# Patient Record
Sex: FEMALE | Race: BLACK OR AFRICAN AMERICAN | URBAN - METROPOLITAN AREA
[De-identification: names, ages, dates, MRNs, and addresses within clinical notes are randomized per-mention and may not be internally consistent; named-entity substitution may affect disease eponyms.]

---

## 2020-10-22 ENCOUNTER — ERX REFILL RESPONSE (OUTPATIENT)
Dept: URBAN - METROPOLITAN AREA CLINIC 13 | Facility: CLINIC | Age: 60
End: 2020-10-22

## 2020-10-22 RX ORDER — LANSOPRAZOLE 30 MG/1
TAKE 1 CAPSULE BY MOUTH EVERY DAY CAPSULE, DELAYED RELEASE ORAL
Qty: 90 | Refills: 3

## 2023-02-24 ENCOUNTER — OFFICE VISIT (OUTPATIENT)
Dept: URBAN - METROPOLITAN AREA CLINIC 92 | Facility: CLINIC | Age: 63
End: 2023-02-24

## 2023-02-24 NOTE — HPI-TODAY'S VISIT:
63-year-old female patient presents today with complaints of blood in the stool.  Her last colonoscopy was 9-2019 that demonstrated diverticula in sigmoid, otherwise normal repeat in 5 to 10 years Patient also had a upper endoscopy due to dysphagia, heartburn this demonstrated LA grade C reflux esophagitis biopsy showed reflux esophagitis no IN medium hiatal hernia, chronic gastritis no HP

## 2023-03-01 ENCOUNTER — OFFICE VISIT (OUTPATIENT)
Dept: URBAN - METROPOLITAN AREA CLINIC 92 | Facility: CLINIC | Age: 63
End: 2023-03-01
Payer: COMMERCIAL

## 2023-03-01 ENCOUNTER — DASHBOARD ENCOUNTERS (OUTPATIENT)
Age: 63
End: 2023-03-01

## 2023-03-01 ENCOUNTER — WEB ENCOUNTER (OUTPATIENT)
Dept: URBAN - METROPOLITAN AREA CLINIC 92 | Facility: CLINIC | Age: 63
End: 2023-03-01

## 2023-03-01 VITALS
HEART RATE: 97 BPM | WEIGHT: 235 LBS | DIASTOLIC BLOOD PRESSURE: 81 MMHG | SYSTOLIC BLOOD PRESSURE: 143 MMHG | BODY MASS INDEX: 44.37 KG/M2 | TEMPERATURE: 97 F | HEIGHT: 61 IN

## 2023-03-01 DIAGNOSIS — K62.5 RECTAL BLEED: ICD-10-CM

## 2023-03-01 PROCEDURE — 99204 OFFICE O/P NEW MOD 45 MIN: CPT | Performed by: INTERNAL MEDICINE

## 2023-03-01 RX ORDER — LANSOPRAZOLE 30 MG/1
TAKE 1 CAPSULE BY MOUTH EVERY DAY CAPSULE, DELAYED RELEASE ORAL
Qty: 90 | Refills: 3 | Status: ACTIVE | COMMUNITY

## 2023-03-01 RX ORDER — HYDROCORTISONE ACETATE 25 MG/1
1 SUPPOSITORY SUPPOSITORY RECTAL ONCE A DAY
Qty: 14 | Refills: 1 | OUTPATIENT
Start: 2023-03-01 | End: 2023-03-29

## 2023-03-01 NOTE — HPI-TODAY'S VISIT:
This is a 60-year-old female presents today for a new problem.  She notes that over the last few months has had increasing rectal bleeding.  This becoming more frequent with her bowel movements.  It is bright red blood.  There is no abdominal pain no abnormal weight loss.  Her last colonoscopy was 2019

## 2023-03-27 ENCOUNTER — OFFICE VISIT (OUTPATIENT)
Dept: URBAN - METROPOLITAN AREA SURGERY CENTER 16 | Facility: SURGERY CENTER | Age: 63
End: 2023-03-27

## 2023-03-27 ENCOUNTER — CLAIMS CREATED FROM THE CLAIM WINDOW (OUTPATIENT)
Dept: URBAN - METROPOLITAN AREA SURGERY CENTER 16 | Facility: SURGERY CENTER | Age: 63
End: 2023-03-27
Payer: COMMERCIAL

## 2023-03-27 DIAGNOSIS — K62.5 ANAL BLEEDING: ICD-10-CM

## 2023-03-27 PROCEDURE — G8907 PT DOC NO EVENTS ON DISCHARG: HCPCS | Performed by: INTERNAL MEDICINE

## 2023-03-27 PROCEDURE — 45378 DIAGNOSTIC COLONOSCOPY: CPT | Performed by: INTERNAL MEDICINE
